# Patient Record
Sex: FEMALE | Race: WHITE | NOT HISPANIC OR LATINO | Employment: OTHER | URBAN - METROPOLITAN AREA
[De-identification: names, ages, dates, MRNs, and addresses within clinical notes are randomized per-mention and may not be internally consistent; named-entity substitution may affect disease eponyms.]

---

## 2023-07-26 ENCOUNTER — TELEPHONE (OUTPATIENT)
Dept: ONCOLOGY | Facility: CLINIC | Age: 68
End: 2023-07-26
Payer: MEDICARE

## 2023-07-26 NOTE — TELEPHONE ENCOUNTER
Called [facility] May[facility] and requested PET report be faxed to our office at [phone]. Called and notified patient that PET report was requested.

## 2023-07-26 NOTE — TELEPHONE ENCOUNTER
Caller: Lacey Merritt    Relationship: Self    Best call back number: [phone]    What is the best time to reach you: ANYTIME    Who are you requesting to speak with (clinical staff, provider,  specific staff member):CLINICAL     What was the call regarding: PATIENT HAD HER PET SCAN DONE TODAY AT [facility] PHONE NUMBER [phone], SHE WAS TOLD BY THEM THAT DR. VEGA OFFICE WOULD NEED TO CALL TO GET THE RESULTS. HER APPT IS ON 7/27/2023. PATIENT DOES HAVE THE DISK.

## 2023-07-27 ENCOUNTER — CONSULT (OUTPATIENT)
Dept: ONCOLOGY | Facility: CLINIC | Age: 68
End: 2023-07-27
Payer: MEDICARE

## 2023-07-27 VITALS
OXYGEN SATURATION: 88 % | HEART RATE: 107 BPM | WEIGHT: 130 LBS | TEMPERATURE: 98.6 F | SYSTOLIC BLOOD PRESSURE: 123 MMHG | RESPIRATION RATE: 18 BRPM | BODY MASS INDEX: 24.55 KG/M2 | DIASTOLIC BLOOD PRESSURE: 65 MMHG | HEIGHT: 61 IN

## 2023-07-27 DIAGNOSIS — C34.81 MALIGNANT NEOPLASM OF OVERLAPPING SITES OF RIGHT BRONCHUS AND LUNG: Primary | ICD-10-CM

## 2023-07-27 RX ORDER — GABAPENTIN 100 MG/1
100 CAPSULE ORAL 3 TIMES DAILY
COMMUNITY
Start: 2023-07-11

## 2023-07-27 NOTE — LETTER
July 27, 2023       No Recipients    Patient: Lacey Merritt   YOB: 1955   Date of Visit: 7/27/2023     Dear Javier Mckinney MD:       Thank you for referring Lacey Merritt to me for evaluation. Below are the relevant portions of my assessment and plan of care.    If you have questions, please do not hesitate to call me. I look forward to following Lacey along with you.         Sincerely,        Marco Antonio Orozco MD        CC:   No Recipients    Marco Antonio Orozco MD  07/27/23 1127  Sign when Signing Visit  CHIEF COMPLAINT: History of lung cancer    REASON FOR REFERRAL: History of lung cancer      RECORDS OBTAINED  Records of the patients history including those obtained from [facility] electronic medical records were reviewed and summarized in detail.    Oncology/Hematology History Overview Note   1.  Squamous cell carcinoma of the lung treated with SBRT to two right lung lesions [facility]  and Dr. Osborn.       Oncology history timeline:  -3/21/2022 CT chest showing masslike density right lower lobe  -4/6/2022 thoracic surgery consultation removal.  -4/21/2022 PET CT 2 cm right lower lobe as well as 0.7 cm right upper lobe hypermetabolic lesions with no adenopathy or distant metastasis.  -4/26/2022 CT-guided needle biopsy showed squamous cell carcinoma.  ALK, ROS1, EGFR negative.  PD-L1 5%.  Seeing Dr. Kermit Larsen.  - 5/3/2022 Dr. Lisa Lemus cardiothoracic consultation.  Greater than 40-pack-year smoking quit 1 month prior with history of pneumonia December 2021.  CT chest showed enlargement of previously seen lung nodule.  Had seen her partner Dr. Hancock on 4/6/2022 for further work-up.  It was felt this likely represented right lower lobe malignancy and patient underwent PET/CT, CT-guided needle biopsy and PFTs and returns for discussion of management.  Staged as T4N0 clinical stage IIIa.  Surgery would entail right lower lobectomy and then additional right upper lobe segmentectomy  which, if unable to clear margins on the right upper lobe nodule, would necessitate pneumonectomy.  She would not tolerate pneumonectomy.  SBRT to the 2 lesions also an option she lives alone 80 miles away from [place].  - 5/11/2022 PFTs Brandt's FEV1 1.47 L.  DLCO 58% of predicted  -6000 Garnett in 5 fractions to the right lower lobe and 5400 cGy in 3 fractions to right upper lobe Dr. Osborn.  -8/30/2022 PET CT showed posttreatment change right lung with interval response to treatment and no evidence of hypermetabolism  -11/28/2022 CT chest shows posttreatment change with no residual measurable lung lesion.  5 mm nodule lateral aspect right upper lobe was hypermetabolic on prior PET April 2022.  It was 7 mm April 2022.  Continuing monitoring recommended.  -2/28/2023 PET CT showed increased tracer uptake right posterior lateral chest wall which could be treatment related.  No tracer uptake and small subpleural nodule right upper lobe.  Peripheral tracer uptake likely in inflammatory due to complete opacification of left maxillary sinus with chronic anterior and lateral thickening and sclerosis.  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  -3/2/2023 follow-up with Dr. Larsen  -7/26/2023 PET scan [facility] compared to 3/21/2022 CT and 2/28/2023 outside facility PET shows moderate right pleural effusion with right lower lobe atelectasis which would obscure previously described mass but no focal area of increased uptake and no evidence of metastatic disease.    -7/27/2023 [facility] oncology initial consultation: Patient has been getting her care with oncology at [facility] in [place] but lives in [place] and due to hacking of their systems, the patient was concerned about continuity of care and asked me to assume her care.  I reviewed her PET scan report from 7/26/2023.  There was no mention on previous PET of the right pleural effusion or  atelectasis but I suspect both are probably related to prior SBRT to the right lower lobe and right upper lobe hypermetabolic lesions which are not currently hypermetabolic.  If she gets more short of breath we may need to get her back to thoracic surgery for consideration of VATS for diagnostic purposes.  As she is asymptomatic and has not smoked for over a year, I have no other immediate interventions planned but we will repeat her PET prior to return in 3 months.  She will see me sooner as symptoms dictate.     Malignant neoplasm of overlapping sites of right bronchus and lung   7/27/2023 Initial Diagnosis    Malignant neoplasm of overlapping sites of right bronchus and lung         HISTORY OF PRESENT ILLNESS:  The patient is a 68 y.o.  female, referred for history of lung cancer treated with SBRT as outlined above and below    REVIEW OF SYSTEMS:  No new somatic complaints    History reviewed. No pertinent past medical history.  History reviewed. No pertinent surgical history.    Current Outpatient Medications on File Prior to Visit   Medication Sig Dispense Refill   • gabapentin (NEURONTIN) 100 MG capsule Take 1 capsule by mouth 3 (Three) Times a Day.     • oxyCODONE-acetaminophen (PERCOCET) 5-325 MG per tablet Take 1 tablet by mouth Every 4 (Four) Hours As Needed for Severe Pain. 15 tablet 0     No current facility-administered medications on file prior to visit.       Allergies   Allergen Reactions   • Prednisone Anxiety, Palpitations and Shortness Of Breath   • Codeine Anxiety, Nausea And Vomiting and Rash     Other reaction(s): nausea/vomitting   • Hydrocodone-Acetaminophen Anxiety and Nausea And Vomiting   • Latex Dermatitis, Hives, Itching, Rash and Swelling   • Parabens Dermatitis, Hives, Itching, Palpitations and Rash       Social History     Socioeconomic History   • Marital status: Single       History reviewed. No pertinent family history.    PHYSICAL EXAM:  No respiratory distress jaundice or  "icterus.    /65   Pulse 107   Temp 98.6 °F (37 °C)   Resp 18   Ht 154.9 cm (61\")   Wt 59 kg (130 lb)   SpO2 (!) 88%   BMI 24.56 kg/m²     ECOG score: 0       Lab Results   Component Value Date    HGB 13.9 06/22/2023    HCT 43.1 06/22/2023    MCV 95.4 06/22/2023     06/22/2023    WBC 7.70 06/22/2023    NEUTROABS 5.48 06/22/2023    LYMPHSABS 1.55 06/22/2023    MONOSABS 0.54 06/22/2023    EOSABS 0.06 06/22/2023    BASOSABS 0.05 06/22/2023     Lab Results   Component Value Date    GLUCOSE 93 06/22/2023    BUN 13 06/22/2023    CREATININE 0.71 06/22/2023     06/22/2023    K 4.1 06/22/2023     06/22/2023    CO2 29.0 06/22/2023    CALCIUM 9.9 06/22/2023         Assessment & Plan   1.  Squamous cell carcinoma of the lung treated with SBRT to two right lung lesions [facility]  and Dr. Osborn.       Oncology history timeline:  -3/21/2022 CT chest showing masslike density right lower lobe  -4/6/2022 thoracic surgery consultation removal.  -4/21/2022 PET CT 2 cm right lower lobe as well as 0.7 cm right upper lobe hypermetabolic lesions with no adenopathy or distant metastasis.  -4/26/2022 CT-guided needle biopsy showed squamous cell carcinoma.  ALK, ROS1, EGFR negative.  PD-L1 5%.  Seeing Dr. Kermit Larsen.  - 5/3/2022 Dr. Lisa Lemus cardiothoracic consultation.  Greater than 40-pack-year smoking quit 1 month prior with history of pneumonia December 2021.  CT chest showed enlargement of previously seen lung nodule.  Had seen her partner Dr. Hancock on 4/6/2022 for further work-up.  It was felt this likely represented right lower lobe malignancy and patient underwent PET/CT, CT-guided needle biopsy and PFTs and returns for discussion of management.  Staged as T4N0 clinical stage IIIa.  Surgery would entail right lower lobectomy and then additional right upper lobe segmentectomy which, if unable to clear margins on the right upper lobe nodule, would necessitate pneumonectomy.  She would not " tolerate pneumonectomy.  SBRT to the 2 lesions also an option she lives alone 80 miles away from [place].  - 5/11/2022 PFTs Brandt's FEV1 1.47 L.  DLCO 58% of predicted  -6000 Garnett in 5 fractions to the right lower lobe and 5400 cGy in 3 fractions to right upper lobe Dr. Osborn.  -8/30/2022 PET CT showed posttreatment change right lung with interval response to treatment and no evidence of hypermetabolism  -11/28/2022 CT chest shows posttreatment change with no residual measurable lung lesion.  5 mm nodule lateral aspect right upper lobe was hypermetabolic on prior PET April 2022.  It was 7 mm April 2022.  Continuing monitoring recommended.  -2/28/2023 PET CT showed increased tracer uptake right posterior lateral chest wall which could be treatment related.  No tracer uptake and small subpleural nodule right upper lobe.  Peripheral tracer uptake likely in inflammatory due to complete opacification of left maxillary sinus with chronic anterior and lateral thickening and sclerosis.  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  -3/2/2023 follow-up with Dr. Larsen  -7/26/2023 PET scan [facility] compared to 3/21/2022 CT and 2/28/2023 outside facility PET shows moderate right pleural effusion with right lower lobe atelectasis which would obscure previously described mass but no focal area of increased uptake and no evidence of metastatic disease.    -7/27/2023 [facility] oncology initial consultation: Patient has been getting her care with oncology at [facility] in [place] but lives in [place] and due to hacking of their systems, the patient was concerned about continuity of care and asked me to assume her care.  I reviewed her PET scan report from 7/26/2023.  There was no mention on previous PET of the right pleural effusion or atelectasis but I suspect both are probably related to prior SBRT to the right lower lobe and right upper lobe  hypermetabolic lesions which are not currently hypermetabolic.  If she gets more short of breath we may need to get her back to thoracic surgery for consideration of VATS for diagnostic purposes.  As she is asymptomatic and has not smoked for over a year, I have no other immediate interventions planned but we will repeat her PET prior to return in 3 months.  She will see me sooner as symptoms dictate.    Total time of care today inclusive of time spent today prior to patient's arrival reviewing and cataloging past records as outlined above from electronic medical records from [facility] and recent PET report from [facility] and during visit interviewing her as to signs or symptoms of her disease and management thereof and after visit instituting the plan as outlined above took 1 hour patient care time throughout the day today.      Marco Antonio Orozco MD    7/27/2023

## 2023-07-27 NOTE — LETTER
July 27, 2023       No Recipients    Patient: Lacey Merritt   YOB: 1955   Date of Visit: 7/27/2023     Dear Javier Mckinney MD:       Thank you for referring Lacey Merritt to me for evaluation. Below are the relevant portions of my assessment and plan of care.    If you have questions, please do not hesitate to call me. I look forward to following Lacey along with you.         Sincerely,        Marco Antonio Orozco MD        CC:   No Recipients    Marco Antonio Orozco MD  07/27/23 1126  Sign when Signing Visit  CHIEF COMPLAINT: History of lung cancer    REASON FOR REFERRAL: History of lung cancer      RECORDS OBTAINED  Records of the patients history including those obtained from [facility] electronic medical records were reviewed and summarized in detail.    Oncology/Hematology History Overview Note   1.  Squamous cell carcinoma of the lung treated with SBRT to two right lung lesions [facility]  and Dr. Osborn.       Oncology history timeline:  -3/21/2022 CT chest showing masslike density right lower lobe  -4/6/2022 thoracic surgery consultation removal.  -4/21/2022 PET CT 2 cm right lower lobe as well as 0.7 cm right upper lobe hypermetabolic lesions with no adenopathy or distant metastasis.  -4/26/2022 CT-guided needle biopsy showed squamous cell carcinoma.  ALK, ROS1, EGFR negative.  PD-L1 5%.  Seeing Dr. Kermit Larsen.  - 5/3/2022 Dr. Lisa Lemus cardiothoracic consultation.  Greater than 40-pack-year smoking quit 1 month prior with history of pneumonia December 2021.  CT chest showed enlargement of previously seen lung nodule.  Had seen her partner Dr. Hancock on 4/6/2022 for further work-up.  It was felt this likely represented right lower lobe malignancy and patient underwent PET/CT, CT-guided needle biopsy and PFTs and returns for discussion of management.  Staged as T4N0 clinical stage IIIa.  Surgery would entail right lower lobectomy and then additional right upper lobe segmentectomy  which, if unable to clear margins on the right upper lobe nodule, would necessitate pneumonectomy.  She would not tolerate pneumonectomy.  SBRT to the 2 lesions also an option she lives alone 80 miles away from [place].  - 5/11/2022 PFTs Brandt's FEV1 1.47 L.  DLCO 58% of predicted  -6000 Garnett in 5 fractions to the right lower lobe and 5400 cGy in 3 fractions to right upper lobe Dr. Osborn.  -8/30/2022 PET CT showed posttreatment change right lung with interval response to treatment and no evidence of hypermetabolism  -11/28/2022 CT chest shows posttreatment change with no residual measurable lung lesion.  5 mm nodule lateral aspect right upper lobe was hypermetabolic on prior PET April 2022.  It was 7 mm April 2022.  Continuing monitoring recommended.  -2/28/2023 PET CT showed increased tracer uptake right posterior lateral chest wall which could be treatment related.  No tracer uptake and small subpleural nodule right upper lobe.  Peripheral tracer uptake likely in inflammatory due to complete opacification of left maxillary sinus with chronic anterior and lateral thickening and sclerosis.  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  -3/2/2023 follow-up with Dr. Larsen  -7/26/2023 PET scan [facility] compared to 3/21/2022 CT and 2/28/2023 outside facility PET shows moderate right pleural effusion with right lower lobe atelectasis which would obscure previously described mass but no focal area of increased uptake and no evidence of metastatic disease.    -7/27/2023 [facility] oncology initial consultation: Patient has been getting her care with oncology at [facility] in [place] but lives in [place] and due to hacking of their systems, the patient was concerned about continuity of care and asked me to assume her care.  I reviewed her PET scan report from 7/26/2023.  There was no mention on previous PET of the right pleural effusion or  atelectasis but I suspect both are probably related to prior SBRT to the right lower lobe and right upper lobe hypermetabolic lesions which are not currently hypermetabolic.  If she gets more short of breath we may need to get her back to thoracic surgery for consideration of VATS for diagnostic purposes.  As she is asymptomatic and has not smoked for over a year, I have no other immediate interventions planned but we will repeat her PET prior to return in 3 months.  She will see me sooner as symptoms dictate.     Malignant neoplasm of overlapping sites of right bronchus and lung   7/27/2023 Initial Diagnosis    Malignant neoplasm of overlapping sites of right bronchus and lung         HISTORY OF PRESENT ILLNESS:  The patient is a 68 y.o.  female, referred for history of lung cancer treated with SBRT as outlined above and below    REVIEW OF SYSTEMS:  No new somatic complaints    History reviewed. No pertinent past medical history.  History reviewed. No pertinent surgical history.    Current Outpatient Medications on File Prior to Visit   Medication Sig Dispense Refill   • gabapentin (NEURONTIN) 100 MG capsule Take 1 capsule by mouth 3 (Three) Times a Day.     • oxyCODONE-acetaminophen (PERCOCET) 5-325 MG per tablet Take 1 tablet by mouth Every 4 (Four) Hours As Needed for Severe Pain. 15 tablet 0     No current facility-administered medications on file prior to visit.       Allergies   Allergen Reactions   • Prednisone Anxiety, Palpitations and Shortness Of Breath   • Codeine Anxiety, Nausea And Vomiting and Rash     Other reaction(s): nausea/vomitting   • Hydrocodone-Acetaminophen Anxiety and Nausea And Vomiting   • Latex Dermatitis, Hives, Itching, Rash and Swelling   • Parabens Dermatitis, Hives, Itching, Palpitations and Rash       Social History     Socioeconomic History   • Marital status: Single       History reviewed. No pertinent family history.    PHYSICAL EXAM:  No respiratory distress jaundice or  "icterus.    /65   Pulse 107   Temp 98.6 °F (37 °C)   Resp 18   Ht 154.9 cm (61\")   Wt 59 kg (130 lb)   SpO2 (!) 88%   BMI 24.56 kg/m²     ECOG score: 0           ECOG: {findings; ecog performance status:51956}    Lab Results   Component Value Date    HGB 13.9 06/22/2023    HCT 43.1 06/22/2023    MCV 95.4 06/22/2023     06/22/2023    WBC 7.70 06/22/2023    NEUTROABS 5.48 06/22/2023    LYMPHSABS 1.55 06/22/2023    MONOSABS 0.54 06/22/2023    EOSABS 0.06 06/22/2023    BASOSABS 0.05 06/22/2023     Lab Results   Component Value Date    GLUCOSE 93 06/22/2023    BUN 13 06/22/2023    CREATININE 0.71 06/22/2023     06/22/2023    K 4.1 06/22/2023     06/22/2023    CO2 29.0 06/22/2023    CALCIUM 9.9 06/22/2023         Assessment & Plan   1.  Squamous cell carcinoma of the lung treated with SBRT to two right lung lesions [facility]  and Dr. Osborn.       Oncology history timeline:  -3/21/2022 CT chest showing masslike density right lower lobe  -4/6/2022 thoracic surgery consultation removal.  -4/21/2022 PET CT 2 cm right lower lobe as well as 0.7 cm right upper lobe hypermetabolic lesions with no adenopathy or distant metastasis.  -4/26/2022 CT-guided needle biopsy showed squamous cell carcinoma.  ALK, ROS1, EGFR negative.  PD-L1 5%.  Seeing Dr. Kermit Larsen.  - 5/3/2022 Dr. Lisa Lemus cardiothoracic consultation.  Greater than 40-pack-year smoking quit 1 month prior with history of pneumonia December 2021.  CT chest showed enlargement of previously seen lung nodule.  Had seen her partner Dr. Hancock on 4/6/2022 for further work-up.  It was felt this likely represented right lower lobe malignancy and patient underwent PET/CT, CT-guided needle biopsy and PFTs and returns for discussion of management.  Staged as T4N0 clinical stage IIIa.  Surgery would entail right lower lobectomy and then additional right upper lobe segmentectomy which, if unable to clear margins on the right upper lobe " nodule, would necessitate pneumonectomy.  She would not tolerate pneumonectomy.  SBRT to the 2 lesions also an option she lives alone 80 miles away from [place].  - 5/11/2022 PFTs Brandt's FEV1 1.47 L.  DLCO 58% of predicted  -6000 Garnett in 5 fractions to the right lower lobe and 5400 cGy in 3 fractions to right upper lobe Dr. Osborn.  -8/30/2022 PET CT showed posttreatment change right lung with interval response to treatment and no evidence of hypermetabolism  -11/28/2022 CT chest shows posttreatment change with no residual measurable lung lesion.  5 mm nodule lateral aspect right upper lobe was hypermetabolic on prior PET April 2022.  It was 7 mm April 2022.  Continuing monitoring recommended.  -2/28/2023 PET CT showed increased tracer uptake right posterior lateral chest wall which could be treatment related.  No tracer uptake and small subpleural nodule right upper lobe.  Peripheral tracer uptake likely in inflammatory due to complete opacification of left maxillary sinus with chronic anterior and lateral thickening and sclerosis.  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  -3/2/2023 follow-up with Dr. Larsen  -7/26/2023 PET scan [facility] Mayen compared to 3/21/2022 CT and 2/28/2023 outside facility PET shows moderate right pleural effusion with right lower lobe atelectasis which would obscure previously described mass but no focal area of increased uptake and no evidence of metastatic disease.    -7/27/2023 [facility] oncology initial consultation: Patient has been getting her care with oncology at [facility] in [place] but lives in [place] and due to hacking of their systems, the patient was concerned about continuity of care and asked me to assume her care.  I reviewed her PET scan report from 7/26/2023.  There was no mention on previous PET of the right pleural effusion or atelectasis but I suspect both are probably related to prior SBRT  to the right lower lobe and right upper lobe hypermetabolic lesions which are not currently hypermetabolic.  If she gets more short of breath we may need to get her back to thoracic surgery for consideration of VATS for diagnostic purposes.  As she is asymptomatic and has not smoked for over a year, I have no other immediate interventions planned but we will repeat her PET prior to return in 3 months.  She will see me sooner as symptoms dictate.    Total time of care today inclusive of time spent today prior to patient's arrival reviewing and cataloging past records as outlined above from electronic medical records from [facility] and recent PET report from [facility] and during visit interviewing her as to signs or symptoms of her disease and management thereof and after visit instituting the plan as outlined above took 1 hour patient care time throughout the day today.      Marco Antonio Orozco MD    7/27/2023

## 2023-07-27 NOTE — PROGRESS NOTES
CHIEF COMPLAINT: History of lung cancer    REASON FOR REFERRAL: History of lung cancer      RECORDS OBTAINED  Records of the patients history including those obtained from [facility] electronic medical records were reviewed and summarized in detail.    Oncology/Hematology History Overview Note   1.  Squamous cell carcinoma of the lung treated with SBRT to two right lung lesions [facility]  and Dr. Osborn.       Oncology history timeline:  -3/21/2022 CT chest showing masslike density right lower lobe  -4/6/2022 thoracic surgery consultation removal.  -4/21/2022 PET CT 2 cm right lower lobe as well as 0.7 cm right upper lobe hypermetabolic lesions with no adenopathy or distant metastasis.  -4/26/2022 CT-guided needle biopsy showed squamous cell carcinoma.  ALK, ROS1, EGFR negative.  PD-L1 5%.  Seeing Dr. Kermit Larsen.  - 5/3/2022 Dr. Lisa Lemus cardiothoracic consultation.  Greater than 40-pack-year smoking quit 1 month prior with history of pneumonia December 2021.  CT chest showed enlargement of previously seen lung nodule.  Had seen her partner Dr. Hancock on 4/6/2022 for further work-up.  It was felt this likely represented right lower lobe malignancy and patient underwent PET/CT, CT-guided needle biopsy and PFTs and returns for discussion of management.  Staged as T4N0 clinical stage IIIa.  Surgery would entail right lower lobectomy and then additional right upper lobe segmentectomy which, if unable to clear margins on the right upper lobe nodule, would necessitate pneumonectomy.  She would not tolerate pneumonectomy.  SBRT to the 2 lesions also an option she lives alone 80 miles away from [place].  - 5/11/2022 PFTs [place]'s FEV1 1.47 L.  DLCO 58% of predicted  -6000 [place]ett in 5 fractions to the right lower lobe and 5400 cGy in 3 fractions to right upper lobe Dr. Osborn.  -8/30/2022 PET CT showed posttreatment change right lung with interval response to treatment and no evidence of  hypermetabolism  -11/28/2022 CT chest shows posttreatment change with no residual measurable lung lesion.  5 mm nodule lateral aspect right upper lobe was hypermetabolic on prior PET April 2022.  It was 7 mm April 2022.  Continuing monitoring recommended.  -2/28/2023 PET CT showed increased tracer uptake right posterior lateral chest wall which could be treatment related.  No tracer uptake and small subpleural nodule right upper lobe.  Peripheral tracer uptake likely in inflammatory due to complete opacification of left maxillary sinus with chronic anterior and lateral thickening and sclerosis.  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  -3/2/2023 follow-up with Dr. Larsen  -7/26/2023 PET scan [facility] Mayen compared to 3/21/2022 CT and 2/28/2023 outside facility PET shows moderate right pleural effusion with right lower lobe atelectasis which would obscure previously described mass but no focal area of increased uptake and no evidence of metastatic disease.    -7/27/2023 [facility] medical oncology initial consultation: Patient has been getting her care with oncology at [facility] in [place] but lives in [place] and due to hacking of their systems, the patient was concerned about continuity of care and asked me to assume her care.  I reviewed her PET scan report from 7/26/2023.  There was no mention on previous PET of the right pleural effusion or atelectasis but I suspect both are probably related to prior SBRT to the right lower lobe and right upper lobe hypermetabolic lesions which are not currently hypermetabolic.  If she gets more short of breath we may need to get her back to thoracic surgery for consideration of VATS for diagnostic purposes.  As she is asymptomatic and has not smoked for over a year, I have no other immediate interventions planned but we will repeat her PET prior to return in 3 months.  She will see me sooner as symptoms  "dictate.     Malignant neoplasm of overlapping sites of right bronchus and lung   7/27/2023 Initial Diagnosis    Malignant neoplasm of overlapping sites of right bronchus and lung         HISTORY OF PRESENT ILLNESS:  The patient is a 68 y.o.  female, referred for history of lung cancer treated with SBRT as outlined above and below    REVIEW OF SYSTEMS:  No new somatic complaints    History reviewed. No pertinent past medical history.  History reviewed. No pertinent surgical history.    Current Outpatient Medications on File Prior to Visit   Medication Sig Dispense Refill    gabapentin (NEURONTIN) 100 MG capsule Take 1 capsule by mouth 3 (Three) Times a Day.      oxyCODONE-acetaminophen (PERCOCET) 5-325 MG per tablet Take 1 tablet by mouth Every 4 (Four) Hours As Needed for Severe Pain. 15 tablet 0     No current facility-administered medications on file prior to visit.       Allergies   Allergen Reactions    Prednisone Anxiety, Palpitations and Shortness Of Breath    Codeine Anxiety, Nausea And Vomiting and Rash     Other reaction(s): nausea/vomitting    Hydrocodone-Acetaminophen Anxiety and Nausea And Vomiting    Latex Dermatitis, Hives, Itching, Rash and Swelling    Parabens Dermatitis, Hives, Itching, Palpitations and Rash       Social History     Socioeconomic History    Marital status: Single       History reviewed. No pertinent family history.    PHYSICAL EXAM:  No respiratory distress jaundice or icterus.    /65   Pulse 107   Temp 98.6 °F (37 °C)   Resp 18   Ht 154.9 cm (61\")   Wt 59 kg (130 lb)   SpO2 (!) 88%   BMI 24.56 kg/m²     ECOG score: 0       Lab Results   Component Value Date    HGB 13.9 06/22/2023    HCT 43.1 06/22/2023    MCV 95.4 06/22/2023     06/22/2023    WBC 7.70 06/22/2023    NEUTROABS 5.48 06/22/2023    LYMPHSABS 1.55 06/22/2023    MONOSABS 0.54 06/22/2023    EOSABS 0.06 06/22/2023    BASOSABS 0.05 06/22/2023     Lab Results   Component Value Date    GLUCOSE 93 " 06/22/2023    BUN 13 06/22/2023    CREATININE 0.71 06/22/2023     06/22/2023    K 4.1 06/22/2023     06/22/2023    CO2 29.0 06/22/2023    CALCIUM 9.9 06/22/2023         Assessment & Plan   1.  Squamous cell carcinoma of the lung treated with SBRT to two right lung lesions [facility]  and Dr. Osborn.       Oncology history timeline:  -3/21/2022 CT chest showing masslike density right lower lobe  -4/6/2022 thoracic surgery consultation removal.  -4/21/2022 PET CT 2 cm right lower lobe as well as 0.7 cm right upper lobe hypermetabolic lesions with no adenopathy or distant metastasis.  -4/26/2022 CT-guided needle biopsy showed squamous cell carcinoma.  ALK, ROS1, EGFR negative.  PD-L1 5%.  Seeing Dr. Kermit Larsen.  - 5/3/2022 Dr. Lisa Lemus cardiothoracic consultation.  Greater than 40-pack-year smoking quit 1 month prior with history of pneumonia December 2021.  CT chest showed enlargement of previously seen lung nodule.  Had seen her partner Dr. Hancock on 4/6/2022 for further work-up.  It was felt this likely represented right lower lobe malignancy and patient underwent PET/CT, CT-guided needle biopsy and PFTs and returns for discussion of management.  Staged as T4N0 clinical stage IIIa.  Surgery would entail right lower lobectomy and then additional right upper lobe segmentectomy which, if unable to clear margins on the right upper lobe nodule, would necessitate pneumonectomy.  She would not tolerate pneumonectomy.  SBRT to the 2 lesions also an option she lives alone 80 miles away from [place].  - 5/11/2022 PFTs [place]'s FEV1 1.47 L.  DLCO 58% of predicted  -6000 [place]ett in 5 fractions to the right lower lobe and 5400 cGy in 3 fractions to right upper lobe Dr. Osborn.  -8/30/2022 PET CT showed posttreatment change right lung with interval response to treatment and no evidence of hypermetabolism  -11/28/2022 CT chest shows posttreatment change with no residual measurable lung lesion.  5 mm nodule  lateral aspect right upper lobe was hypermetabolic on prior PET April 2022.  It was 7 mm April 2022.  Continuing monitoring recommended.  -2/28/2023 PET CT showed increased tracer uptake right posterior lateral chest wall which could be treatment related.  No tracer uptake and small subpleural nodule right upper lobe.  Peripheral tracer uptake likely in inflammatory due to complete opacification of left maxillary sinus with chronic anterior and lateral thickening and sclerosis.  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  -3/2/2023 follow-up with Dr. Larsen  -7/26/2023 PET scan [facility] Mayen compared to 3/21/2022 CT and 2/28/2023 outside facility PET shows moderate right pleural effusion with right lower lobe atelectasis which would obscure previously described mass but no focal area of increased uptake and no evidence of metastatic disease.    -7/27/2023 [facility] oncology initial consultation: Patient has been getting her care with oncology at [facility] in [place] but lives in [place] and due to hacking of their systems, the patient was concerned about continuity of care and asked me to assume her care.  I reviewed her PET scan report from 7/26/2023.  There was no mention on previous PET of the right pleural effusion or atelectasis but I suspect both are probably related to prior SBRT to the right lower lobe and right upper lobe hypermetabolic lesions which are not currently hypermetabolic.  If she gets more short of breath we may need to get her back to thoracic surgery for consideration of VATS for diagnostic purposes.  As she is asymptomatic and has not smoked for over a year, I have no other immediate interventions planned but we will repeat her PET prior to return in 3 months.  She will see me sooner as symptoms dictate.    Total time of care today inclusive of time spent today prior to patient's arrival reviewing and cataloging past  records as outlined above from electronic medical records from [facility] and recent PET report from [place] and during visit interviewing her as to signs or symptoms of her disease and management thereof and after visit instituting the plan as outlined above took 1 hour patient care time throughout the day today.      Marco Antonio Orozco MD    7/27/2023